# Patient Record
(demographics unavailable — no encounter records)

---

## 2025-05-06 NOTE — PHYSICAL EXAM
[TextEntry] : GENERAL: Appears in no acute distress  HEENT: EOMI, PERRLA. No conjunctival erythema. Moist mucous membranes. No nasopharyngeal ulcers  NECK: Supple, no cervical lymphadenopathy, no thyromegaly  CARDIOVASCULAR: RRR. S1, S2 auscultated. No murmurs or rubs.  PULMONARY: Clear to auscultation b/l, no wheezes, rales, or crackles  ABDOMINAL: Soft, nontender, nondistended. Bowel sounds present. No organomegaly.  MSK:  No active synovitis, swelling, erythema, or warmth.  No joint tenderness to palpation.  No deformities.  Equivocal R wrist Tinel's test of median nerve SKIN: No lesions or rashes  NEURO: No focal deficits PSYCH: AAOx3. Normal affect and thought process.

## 2025-05-06 NOTE — HISTORY OF PRESENT ILLNESS
[FreeTextEntry1] : 35 y/o female referred to rheumatology for abnormal labwork. Pt was seen by PCP on 4/4/25 and was discussing worsening fatigue, R 1st and 3rd digit numbness, back pain. Reports of L upper back swelling and feet swelling. Reports SOB when going up flights of stairs. Pt had labwork done which reportedly showed abnormal labwork, although the abnormal labwork is not visible to me.

## 2025-05-06 NOTE — ASSESSMENT
[FreeTextEntry1] : 37 y/o female referred to rheumatology for abnormal labwork. Pt was seen by PCP on 4/4/25 and was discussing worsening fatigue, R 1st and 3rd digit numbness, back pain. Reports of L upper back swelling and feet swelling. Reports SOB when going up flights of stairs. Pt had labwork done which reportedly showed abnormal labwork, although the abnormal labwork is not visible to me.  Patient reportedly has abnormal autoimmune labwork in setting of several different symptoms. These symptoms are either non-specific (fatigue), most likely mechanical (back pain likely muscular, hand numbness likely CTS), or unrelated (leg swelling and SOB). I have low suspicion for systemic autoimmune diseases and suspect any abnormal labwork was false positive.  - Obtain labwork to evaluate for signs of systemic autoimmune diseases - Pt defers follow up with neuro for R hand numbness for now. Advised on wrist brace, NSAIDs PRN for now for possible R CTS. - Will contact pt with results for any next steps in evaluation.